# Patient Record
Sex: MALE
[De-identification: names, ages, dates, MRNs, and addresses within clinical notes are randomized per-mention and may not be internally consistent; named-entity substitution may affect disease eponyms.]

---

## 2023-02-10 PROBLEM — Z00.00 ENCOUNTER FOR PREVENTIVE HEALTH EXAMINATION: Status: ACTIVE | Noted: 2023-02-10

## 2023-02-14 ENCOUNTER — APPOINTMENT (OUTPATIENT)
Dept: UROLOGY | Facility: CLINIC | Age: 43
End: 2023-02-14
Payer: COMMERCIAL

## 2023-02-14 VITALS
OXYGEN SATURATION: 97 % | DIASTOLIC BLOOD PRESSURE: 91 MMHG | SYSTOLIC BLOOD PRESSURE: 148 MMHG | TEMPERATURE: 97.3 F | HEART RATE: 63 BPM

## 2023-02-14 DIAGNOSIS — C64.1 MALIGNANT NEOPLASM OF RIGHT KIDNEY, EXCEPT RENAL PELVIS: ICD-10-CM

## 2023-02-14 PROCEDURE — 99205 OFFICE O/P NEW HI 60 MIN: CPT

## 2023-02-14 RX ORDER — AMLODIPINE BESYLATE 5 MG/1
5 TABLET ORAL
Refills: 0 | Status: ACTIVE | COMMUNITY

## 2023-02-14 RX ORDER — PANTOPRAZOLE 40 MG/1
40 TABLET, DELAYED RELEASE ORAL
Refills: 0 | Status: ACTIVE | COMMUNITY

## 2023-02-14 RX ORDER — HYDROCHLOROTHIAZIDE 12.5 MG/1
12.5 CAPSULE ORAL
Refills: 0 | Status: ACTIVE | COMMUNITY

## 2023-02-14 RX ORDER — VALSARTAN 40 MG/1
TABLET, COATED ORAL
Refills: 0 | Status: ACTIVE | COMMUNITY

## 2023-02-14 NOTE — HISTORY OF PRESENT ILLNESS
[FreeTextEntry1] : Dr. Thomas Blanca\par [address]\[place] [address]\par [phone]\par \par Dr. Thomas Pineda\par [address]\[place] [address]\par [phone]\par \par \par CC: Renal Mass\par \par This is a 42 year old male who presents today with 13 x 12 x 10 mm right lateral midpole cortical lesion \par \par Abdominal issues, went for CT \par Followed up with MRI --> 13 mm right lateral midpole lesion, enhancing.  Leading ddx includes oncocytoma and renal cell carcinoma. \par \par Intermittent Gross hematuria in the past \par He states he had some sort of "flap" that did not stay closed\par Was performing CIC for short period of time \par \par Currently denies any difficulty urinating \par \par Surgical Hx: thyglossal cyst, urethrotomy 2000 \par Social Hx: non smoker, ETOH 10 drinks, works in finance \par Family Hx:mother with lung and brain cancer, sister with thyroid and leukemia \par ROS: negative 10 system review \par

## 2023-02-14 NOTE — ASSESSMENT
[FreeTextEntry1] : Diagnosis: \par Enhancing renal mass in young and healthy 42 year old male\par \par We reviewed the possible underlying histology of solid enhancing renal masses, with the majority being malignant (~80%) whereas ~20% are benign (e.g. oncocytoma).  We discussed the role/possibility of percutaneous biopsy, with the associated risks, benefits, complications, and accuracy issues (e.g. risk of false negative results). \par \par The heterogeneous natural history/biology of renal cell carcinoma was discussed, including the fact that while many renal cancers are indolent, others behave aggresssively.  \par \par Options were reviewed including, not limited to, active surveillance (AS), surgical extirpation and ablation.  The risks of tumor growth and metastasis on active surveillance were reviewed, including the fact that metastatic progression on AS could mean missing the opportunity for cure.  The average growth rate of ~2-3 mm/year and metastasis rates of ~2-3% on AS over 5 year interval for small renal masses <4 cm was discussed. \par \par With respect to treatment we reviewed ablation (cryosurgery, radiofrequency ablation), risks of recurrence, opportunities for salvage treatment, and imaging requirements for follow up.  Oncologic outcomes for ablation were reviewed. \par \par With respect to surgery we reviewed nephron sparing surgery vs. radical nephrectomy, as well as open vs. minimally invasive surgical (MIS) approaches (e.g. laparoscopy and robotic assisted laparoscopic surgery).  Personal and institutional experience, as well as other published literature was reviewed.  Oncologic outcomes, renal functional outcomes were compared and contrasted between radical vs. NSS and open vs. MIS surgery.  Risks of acute kidney injury, medical/surgical chronic kidney disease (either exacerbation or new-onset), as well as risks of ESRD development were reviewed.   Risks of conversion from MIS to open surgery discussed.  Risks of converting from attempted partial nephrectomy to radical nephrectomy were discussed.  Risks of surgical complications were reviewed, including not limited to: bleeding//life-threatening hemorrhage, vascular/bowel/adjacent visceral organ injury, trocar/access injury, the possibility of recognized vs. unrecognized/delayed-recognition injury, risks of thermal/blunt/sharp/retraction injury, risks of DVT, PE, MI, death, risks of cardiopulmonary/anesthesia related complications, positional injury, infection/collection/abscess, wound complications/dehiscence/seroma/cellulitis, urinoma/fistula, ureteral injury/obstruction, as well as other complications\par \par Patient prefers to have the mass excised as he is young and healthy and does not want long term surveillance and question of underlying pathology.  \par \par He understands all r/b/a and will proceed to schedule\par \par Jono Harris MD, FACS, FRCS \par  of Urology [facility]\par Director of Laparoscopic and Robotic Surgery \par [facility] Director of Urology, [facility] \par Professor of Urology\par \par (Office) [phone]\par (Cell)  [phone] \par [email or web address] \par \par \par

## 2023-02-14 NOTE — PHYSICAL EXAM
[General Appearance - Well Developed] : well developed [General Appearance - Well Nourished] : well nourished [Normal Appearance] : normal appearance [Well Groomed] : well groomed [General Appearance - In No Acute Distress] : no acute distress [Edema] : no peripheral edema [] : no respiratory distress [Respiration, Rhythm And Depth] : normal respiratory rhythm and effort [Exaggerated Use Of Accessory Muscles For Inspiration] : no accessory muscle use [Abdomen Soft] : soft [Abdomen Tenderness] : non-tender [Costovertebral Angle Tenderness] : no ~M costovertebral angle tenderness [Normal Station and Gait] : the gait and station were normal for the patient's age [No Focal Deficits] : no focal deficits [Oriented To Time, Place, And Person] : oriented to person, place, and time [Affect] : the affect was normal [Mood] : the mood was normal [Not Anxious] : not anxious [No Palpable Adenopathy] : no palpable adenopathy

## 2023-11-06 ENCOUNTER — APPOINTMENT (OUTPATIENT)
Dept: UROLOGY | Facility: HOSPITAL | Age: 43
End: 2023-11-06

## 2024-03-05 ENCOUNTER — APPOINTMENT (OUTPATIENT)
Dept: UROLOGY | Facility: CLINIC | Age: 44
End: 2024-03-05
Payer: COMMERCIAL

## 2024-03-05 PROCEDURE — 99214 OFFICE O/P EST MOD 30 MIN: CPT

## 2024-03-05 NOTE — HISTORY OF PRESENT ILLNESS
[FreeTextEntry1] : Dr. Thomas Blanca [address] [place] [address] [phone]  Dr. Thomas Pineda [address] [place], [place] [id number] [phone]   CC: Renal Mass  This is a 43 year old male who presents today with right renal mass Recent MRI personally reviewed and independently interpreted - stable lesion.   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

## 2024-05-22 ENCOUNTER — APPOINTMENT (OUTPATIENT)
Dept: UROLOGY | Facility: HOSPITAL | Age: 44
End: 2024-05-22

## 2024-10-07 NOTE — ASSESSMENT
[FreeTextEntry1] : DX: small enhancing renal mass  We reviewed the possible underlying histology of solid enhancing renal masses, with the majority being malignant (~80%) whereas ~20% are benign (e.g. oncocytoma).  We discussed the role/possibility of percutaneous biopsy, with the associated risks, benefits, complications, and accuracy issues (e.g. risk of false negative results).   The heterogeneous natural history/biology of renal cell carcinoma was discussed, including the fact that while many renal cancers are indolent, others behave aggresssively.    Options were reviewed including, not limited to, active surveillance (AS), surgical extirpation and ablation.  The risks of tumor growth and metastasis on active surveillance were reviewed, including the fact that metastatic progression on AS could mean missing the opportunity for cure.  The average growth rate of ~2-3 mm/year and metastasis rates of ~2-3% on AS over 5 year interval for small renal masses <4 cm was discussed.   With respect to treatment we reviewed ablation (cryosurgery, radiofrequency ablation), risks of recurrence, opportunities for salvage treatment, and imaging requirements for follow up.  Oncologic outcomes for ablation were reviewed.   With respect to surgery we reviewed nephron sparing surgery vs. radical nephrectomy, as well as open vs. minimally invasive surgical (MIS) approaches (e.g. laparoscopy and robotic assisted laparoscopic surgery).  Personal and institutional experience, as well as other published literature was reviewed.  Oncologic outcomes, renal functional outcomes were compared and contrasted between radical vs. NSS and open vs. MIS surgery.  Risks of acute kidney injury, medical/surgical chronic kidney disease (either exacerbation or new-onset), as well as risks of ESRD development were reviewed.   Risks of conversion from MIS to open surgery discussed.  Risks of converting from attempted partial nephrectomy to radical nephrectomy were discussed.  Risks of surgical complications were reviewed, including not limited to: bleeding//life-threatening hemorrhage, vascular/bowel/adjacent visceral organ injury, trocar/access injury, the possibility of recognized vs. unrecognized/delayed-recognition injury, risks of thermal/blunt/sharp/retraction injury, risks of DVT, PE, MI, death, risks of cardiopulmonary/anesthesia related complications, positional injury, infection/collection/abscess, wound complications/dehiscence/seroma/cellulitis, urinoma/fistula, ureteral injury/obstruction, as well as other complications.   Given small size of lesion, risk of not finding the lesion, resecting adjacent parenchyma mistaken for the lesion on intraop US, reviewed  This continues to generate stress and he wishes to remove lesion, understanding above r/b/a and complications.   plan for rPN  Jono Harris MD, FACS, FRCS  of Urology [facility] Director of Laparoscopic and Robotic Surgery [facility] Director of Urology, [facility] Professor of Urology   (Office) [phone] (Cell)  [phone] [email or web address]  The total amount of time I have personally spent preparing for this visit, reviewing the patient's test results, obtaining external history, ordering tests/medications, documenting clinical information, communicating with and counseling the patient/family and/or caregiver(s), and spent face to face with the patient explaining the above was minutes =30     Laceration Repair